# Patient Record
Sex: MALE | URBAN - NONMETROPOLITAN AREA
[De-identification: names, ages, dates, MRNs, and addresses within clinical notes are randomized per-mention and may not be internally consistent; named-entity substitution may affect disease eponyms.]

---

## 2020-10-12 ENCOUNTER — HOSPITAL ENCOUNTER (OUTPATIENT)
Dept: LAB | Age: 55
Discharge: HOME | End: 2020-10-12

## 2020-10-12 ENCOUNTER — OFFICE VISIT (OUTPATIENT)
Dept: FAMILY MEDICINE CLINIC | Age: 55
End: 2020-10-12
Payer: COMMERCIAL

## 2020-10-12 VITALS
SYSTOLIC BLOOD PRESSURE: 140 MMHG | OXYGEN SATURATION: 97 % | BODY MASS INDEX: 28 KG/M2 | TEMPERATURE: 96.7 F | WEIGHT: 200 LBS | DIASTOLIC BLOOD PRESSURE: 94 MMHG | HEART RATE: 98 BPM | HEIGHT: 71 IN

## 2020-10-12 DIAGNOSIS — Z00.00 ENCOUNTER FOR PREVENTATIVE ADULT HEALTH CARE EXAMINATION: Primary | ICD-10-CM

## 2020-10-12 DIAGNOSIS — Z00.00 ENCOUNTER FOR PREVENTATIVE ADULT HEALTH CARE EXAMINATION: ICD-10-CM

## 2020-10-12 PROBLEM — M25.561 ACUTE PAIN OF RIGHT KNEE: Status: ACTIVE | Noted: 2020-10-12

## 2020-10-12 PROCEDURE — 99396 PREV VISIT EST AGE 40-64: CPT | Performed by: FAMILY MEDICINE

## 2020-10-12 RX ORDER — PREDNISONE 20 MG/1
TABLET ORAL
Qty: 21 TAB | Refills: 0 | Status: SHIPPED | OUTPATIENT
Start: 2020-10-12

## 2020-10-12 RX ORDER — ATORVASTATIN CALCIUM 20 MG/1
20 TABLET, FILM COATED ORAL DAILY
Qty: 30 TAB | Refills: 2 | Status: SHIPPED | OUTPATIENT
Start: 2020-10-12 | End: 2021-01-10

## 2020-10-12 RX ORDER — ATORVASTATIN CALCIUM 20 MG/1
1 TABLET, FILM COATED ORAL
COMMUNITY
Start: 2020-09-08 | End: 2020-10-12

## 2020-10-12 NOTE — PROGRESS NOTES
Subjective:   Twin Kruse is a 60 y.o. male who was seen for Knee Pain    HPI patient is a 57-year-old male who is seen for evaluation and physical exam.  He has had wellness on his mind but been able to try to get into our office is also had some right knee discomfort. He has a history of some fullness at the left lateral prostate and has been evaluated by urology and they told us not to worry about that. No falls or injuries no rash no syncope no loss of consciousness. He has had the knee pain really for the last several months after driving back and forth to [place] no chest congestion or cough no rash no syncope or loss of consciousness. Bowel movements have been appropriate. His colonoscopy is 7 years and was appropriate. He eats well. He denies any anxiety or depression. No falls or injuries. Home Medications    Medication Sig Start Date End Date Taking? Authorizing Provider   atorvastatin (LIPITOR) 20 mg tablet Take 1 Tab by mouth nightly. 9/8/20   Provider, Historical      No Known Allergies  Social History     Tobacco Use    Smoking status: Former Smoker     Packs/day: 1.00     Years: 25.00     Pack years: 25.00     Last attempt to quit: 3/30/2010     Years since quitting: 10.5    Smokeless tobacco: Never Used   Substance Use Topics    Alcohol use: Yes     Alcohol/week: 10.0 standard drinks     Types: 10 Shots of liquor per week    Drug use: Never            Review of Systems   Constitutional: Negative. HENT: Negative. Respiratory: Negative. Cardiovascular: Negative. Gastrointestinal: Negative. Endocrine: Negative. Genitourinary: Negative. Musculoskeletal: Positive for back pain and joint swelling. Allergic/Immunologic: Negative. Neurological: Negative. Hematological: Negative. Psychiatric/Behavioral: Negative.          Physical Exam   Objective:     Visit Vitals  BP (!) 140/94 (BP 1 Location: Right arm, BP Patient Position: Sitting)   Pulse 98   Temp (!) 96.7 °F (35.9 °C)   Ht 5' 11\" (1.803 m)   Wt 200 lb (90.7 kg)   SpO2 97%   BMI 27.89 kg/m²      General: alert, cooperative, no distress   Mental  status: normal mood, behavior, speech, dress, motor activity, and thought processes, able to follow commands   HENT: NCAT   Neck: no visualized mass   Resp: no respiratory distress   Neuro: no gross deficits   Skin: no discoloration or lesions of concern on visible areas   Psychiatric: normal affect, consistent with stated mood, no evidence of hallucinations   Slightly decreased range of motion at the back. Genitalia are normal he is uncircumcised. The rectal exam shows some fullness at the bottom of the right side of the prostate which is unchanged from previous exams. He is oriented x3 not clinically anxious or depressed. He is pleasant and cooperative today. His blood pressure was 130/80    Assessment & Plan: At wellness exam, right knee pain: His medications have been refilled. His blood work will be ordered. We will follow up with a PSA as well. No other changes in medicine. We have refilled his medicine I am going to call in some prednisone for the knee discomfort. I will call you with results of his blood work. [id number]    Additional exam findings: We discussed the expected course, resolution and complications of the diagnosis(es) in detail. Medication risks, benefits, costs, interactions, and alternatives were discussed as indicated. I advised him to contact the office if his condition worsens, changes or fails to improve as anticipated. He expressed understanding with the diagnosis(es) and plan.

## 2020-10-12 NOTE — PROGRESS NOTES
Tameka Luis. presents today for   Chief Complaint   Patient presents with    Knee Pain       Is someone accompanying this pt? no    Is the patient using any DME equipment during OV? no    Depression Screening:  3 most recent PHQ Screens 10/12/2020   Little interest or pleasure in doing things Not at all   Feeling down, depressed, irritable, or hopeless Not at all   Total Score PHQ 2 0       Learning Assessment:  Learning Assessment 10/12/2020   PRIMARY LEARNER Patient   PRIMARY LANGUAGE ENGLISH   LEARNER PREFERENCE PRIMARY DEMONSTRATION   ANSWERED BY patient   RELATIONSHIP SELF       Health Maintenance reviewed and discussed and ordered per Provider. Health Maintenance Due   Topic Date Due    Hepatitis C Screening  1965    Lipid Screen  08/05/1973    DTaP/Tdap/Td series (1 - Tdap) 08/05/1984    Shingrix Vaccine Age 50> (1 of 2) 08/05/2013    FOBT Q1Y Age 50-75  08/05/2013    Flu Vaccine (1) 09/01/2020   . Coordination of Care:  1. Have you been to the ER, urgent care clinic since your last visit? Hospitalized since your last visit? no    2. Have you seen or consulted any other health care providers outside of the [address] [place] [address] since your last visit? Include any pap smears or colon screening.  no    Providers orders his own labs, orders for colonoscopy, mammograms and referrals as needed    Last UDS Checked no  Last Pain contract signed: no

## 2020-10-13 LAB — PSA, EXTERNAL: 1.3

## 2020-10-23 ENCOUNTER — TELEPHONE (OUTPATIENT)
Dept: FAMILY MEDICINE CLINIC | Age: 55
End: 2020-10-23

## 2020-10-23 DIAGNOSIS — G89.29 CHRONIC PAIN OF RIGHT KNEE: Primary | ICD-10-CM

## 2020-10-23 DIAGNOSIS — M25.561 CHRONIC PAIN OF RIGHT KNEE: Primary | ICD-10-CM

## 2020-11-11 PROBLEM — Z00.00 ENCOUNTER FOR PREVENTATIVE ADULT HEALTH CARE EXAMINATION: Status: RESOLVED | Noted: 2020-10-12 | Resolved: 2020-11-11

## 2021-01-16 RX ORDER — ATORVASTATIN CALCIUM 20 MG/1
TABLET, FILM COATED ORAL
Qty: 30 TAB | Refills: 1 | Status: SHIPPED | OUTPATIENT
Start: 2021-01-16 | End: 2021-04-19

## 2021-04-19 RX ORDER — ATORVASTATIN CALCIUM 20 MG/1
TABLET, FILM COATED ORAL
Qty: 30 TAB | Refills: 1 | Status: SHIPPED | OUTPATIENT
Start: 2021-04-19

## 2022-03-18 PROBLEM — M25.561 ACUTE PAIN OF RIGHT KNEE: Status: ACTIVE | Noted: 2020-10-12

## 2023-12-11 ENCOUNTER — OFFICE VISIT (OUTPATIENT)
Age: 58
End: 2023-12-11

## 2023-12-11 VITALS — BODY MASS INDEX: 28 KG/M2 | HEIGHT: 71 IN | WEIGHT: 200 LBS

## 2023-12-11 DIAGNOSIS — G89.29 CHRONIC PAIN OF RIGHT KNEE: Primary | ICD-10-CM

## 2023-12-11 DIAGNOSIS — M17.11 OSTEOARTHRITIS OF RIGHT KNEE, UNSPECIFIED OSTEOARTHRITIS TYPE: ICD-10-CM

## 2023-12-11 DIAGNOSIS — M25.561 CHRONIC PAIN OF RIGHT KNEE: Primary | ICD-10-CM

## 2023-12-11 RX ORDER — LIDOCAINE HYDROCHLORIDE 10 MG/ML
9 INJECTION, SOLUTION INFILTRATION; PERINEURAL ONCE
Status: COMPLETED | OUTPATIENT
Start: 2023-12-11 | End: 2023-12-11

## 2023-12-11 RX ORDER — TRIAMCINOLONE ACETONIDE 40 MG/ML
40 INJECTION, SUSPENSION INTRA-ARTICULAR; INTRAMUSCULAR ONCE
Status: COMPLETED | OUTPATIENT
Start: 2023-12-11 | End: 2023-12-11

## 2023-12-11 RX ADMIN — LIDOCAINE HYDROCHLORIDE 9 ML: 10 INJECTION, SOLUTION INFILTRATION; PERINEURAL at 15:38

## 2023-12-11 RX ADMIN — TRIAMCINOLONE ACETONIDE 40 MG: 40 INJECTION, SUSPENSION INTRA-ARTICULAR; INTRAMUSCULAR at 15:38

## 2023-12-11 SDOH — HEALTH STABILITY: PHYSICAL HEALTH: ON AVERAGE, HOW MANY MINUTES DO YOU ENGAGE IN EXERCISE AT THIS LEVEL?: 30 MIN

## 2023-12-11 SDOH — HEALTH STABILITY: PHYSICAL HEALTH: ON AVERAGE, HOW MANY DAYS PER WEEK DO YOU ENGAGE IN MODERATE TO STRENUOUS EXERCISE (LIKE A BRISK WALK)?: 5 DAYS

## 2024-04-11 ENCOUNTER — OFFICE VISIT (OUTPATIENT)
Age: 59
End: 2024-04-11
Payer: COMMERCIAL

## 2024-04-11 DIAGNOSIS — M25.561 CHRONIC PAIN OF RIGHT KNEE: ICD-10-CM

## 2024-04-11 DIAGNOSIS — M17.11 OSTEOARTHRITIS OF RIGHT KNEE, UNSPECIFIED OSTEOARTHRITIS TYPE: Primary | ICD-10-CM

## 2024-04-11 DIAGNOSIS — G89.29 CHRONIC PAIN OF RIGHT KNEE: ICD-10-CM

## 2024-04-11 PROCEDURE — 20611 DRAIN/INJ JOINT/BURSA W/US: CPT

## 2024-04-11 RX ORDER — MELOXICAM 15 MG/1
TABLET ORAL
COMMUNITY
Start: 2024-03-15

## 2024-04-11 RX ORDER — TRIAMCINOLONE ACETONIDE 40 MG/ML
40 INJECTION, SUSPENSION INTRA-ARTICULAR; INTRAMUSCULAR ONCE
Status: COMPLETED | OUTPATIENT
Start: 2024-04-11 | End: 2024-04-11

## 2024-04-11 RX ORDER — POLYETHYLENE GLYCOL-3350 AND ELECTROLYTES 236; 6.74; 5.86; 2.97; 22.74 G/274.31G; G/274.31G; G/274.31G; G/274.31G; G/274.31G
POWDER, FOR SOLUTION ORAL
COMMUNITY
Start: 2024-03-11

## 2024-04-11 RX ORDER — LIDOCAINE HYDROCHLORIDE 10 MG/ML
9 INJECTION, SOLUTION INFILTRATION; PERINEURAL ONCE
Status: COMPLETED | OUTPATIENT
Start: 2024-04-11 | End: 2024-04-11

## 2024-04-11 RX ADMIN — TRIAMCINOLONE ACETONIDE 40 MG: 40 INJECTION, SUSPENSION INTRA-ARTICULAR; INTRAMUSCULAR at 10:48

## 2024-04-11 RX ADMIN — LIDOCAINE HYDROCHLORIDE 9 ML: 10 INJECTION, SOLUTION INFILTRATION; PERINEURAL at 10:48

## 2024-04-11 NOTE — PATIENT INSTRUCTIONS
you start to have pain, rest your knee until your pain gets back to the level that is normal for you. Or cycle for less time or with less effort.  Follow-up care is a key part of your treatment and safety. Be sure to make and go to all appointments, and call your doctor if you are having problems. It's also a good idea to know your test results and keep a list of the medicines you take.  Current as of: July 18, 2023               Content Version: 13.9  © 2006-2023 [employer].   Care instructions adapted under license by [employer]. If you have questions about a medical condition or this instruction, always ask your healthcare professional. [employer] disclaims any warranty or liability for your use of this information.

## 2024-04-11 NOTE — PROGRESS NOTES
understanding and gave informed consent for the injection. The patient's knee was flexed to 90° and the skin prepped using sterile alcohol solution. A sterile needle was inserted into the right knee and the mixture of 9 mL Lidocaine 1%, 1 mL Kenalog 40 mg was injected under sterile technique. The needle was withdrawn and the puncture site sealed with a Band-Aid.      Technique: Under sterile conditions a [employer] ultrasound unit with a variable frequency (7.0-14.0 MHz) linear transducer was used to localize the placement of needle into the right knee joint.     Findings: Successful needle placement for knee injection.  Final images were taken and saved for permanent record.       The patient tolerated the injection well. The patient was instructed to call the office immediately if there is any pain, redness, warmth, fever, or chills.       Plan:  Plan will be for right knee cortisone injection today, if it helps that is all we need to do otherwise we will see the patient back as needed or if symptoms worsen.  Activity and weightbearing as tolerated, no restrictions.    Encounter Diagnoses   Name Primary?    Osteoarthritis of right knee, unspecified osteoarthritis type Yes    Chronic pain of right knee         As part of continued conservative pain management options the patient was advised to utilize Tylenol or OTC NSAIDS as long as it is not medically contraindicated.     Return to Office:   Follow-up and Dispositions    Return if symptoms worsen or fail to improve.        Scribed by CHANDA Alvarado - CNP as dictated by CHANDA Godfrey, CNP.  Documentation, performed by, True and Accepted CHANDA Godfrey, CNP

## 2024-07-18 ENCOUNTER — OFFICE VISIT (OUTPATIENT)
Age: 59
End: 2024-07-18
Payer: COMMERCIAL

## 2024-07-18 VITALS — WEIGHT: 200 LBS | BODY MASS INDEX: 28 KG/M2 | HEIGHT: 71 IN

## 2024-07-18 DIAGNOSIS — M17.11 OSTEOARTHRITIS OF RIGHT KNEE, UNSPECIFIED OSTEOARTHRITIS TYPE: ICD-10-CM

## 2024-07-18 DIAGNOSIS — Z01.818 PRE-OP TESTING: ICD-10-CM

## 2024-07-18 DIAGNOSIS — M17.11 OSTEOARTHRITIS OF RIGHT KNEE, UNSPECIFIED OSTEOARTHRITIS TYPE: Primary | ICD-10-CM

## 2024-07-18 PROCEDURE — 99214 OFFICE O/P EST MOD 30 MIN: CPT | Performed by: ORTHOPAEDIC SURGERY

## 2024-07-18 NOTE — PROGRESS NOTES
Name: Gonzalo Whitmore Jr.    : 1965     [facility]  [address], [facility]  [facility] [address]  Dept: [phone]  Dept Fax: [phone]     Chief Complaint   Patient presents with    Knee Pain        Ht 1.803 m (5' 11\")   Wt 90.7 kg (200 lb)   BMI 27.89 kg/m²      No Known Allergies     Current Outpatient Medications   Medication Sig Dispense Refill    meloxicam (MOBIC) 15 MG tablet TAKE 1/2 TABLET BY MOUTH TWICE A DAY WITH FOOD AS NEEDED 30      GAVILYTE-G 236 g solution FOLLOW PREP INSTRUCTIONS FROM YOUR DOCTOR'S OFFICE, NOT THE BRAND LABEL      atorvastatin (LIPITOR) 20 MG tablet TAKE 1 TABLET BY MOUTH EVERY DAY       No current facility-administered medications for this visit.       Patient Active Problem List   Diagnosis    Acute pain of right knee      Family History   Problem Relation Age of Onset    Cancer Father        Past Surgical History:   Procedure Laterality Date    DENTAL SURGERY      NC UNLISTED PROCEDURE ABDOMEN PERITONEUM & OMENTUM        Past Medical History:   Diagnosis Date    Hypercholesterolemia         I have reviewed and agree with PFSH and ROS and intake form in chart and the record furthermore I have reviewed prior medical record(s) regarding this patients care during this appointment.     Review of Systems:   Patient is a pleasant appearing individual, appropriately dressed, well hydrated, well nourished, who is alert, appropriately oriented for age, and in no acute distress with a normal gait and normal affect who does not appear to be in any significant pain.    Physical Exam:  Right Knee -Decrease range of motion with flexion, Knee arc of greater than 50 degrees, Some crepitation, Grossly neurovascularly intact, Good cap refill, No skin lesion, Moderate swelling, some gross instability, Some quadriceps weakness, Kellgren and Romel at least grade 4    Left Knee - Full Range of

## 2024-09-28 LAB
HBA1C MFR BLD HPLC: 5.5 %
PSA, EXTERNAL: 2

## 2024-10-02 DIAGNOSIS — Z96.651 STATUS POST TOTAL RIGHT KNEE REPLACEMENT: Primary | ICD-10-CM

## 2024-10-09 ENCOUNTER — TELEPHONE (OUTPATIENT)
Age: 59
End: 2024-10-09

## 2024-10-09 NOTE — TELEPHONE ENCOUNTER
10/22/24 RT DEJAHR @ [facility]    Wants to cx his surgery bc of his job right now. He will call back when he is ready to r/s.    Addended by: PAPO JOHNSON on: 6/15/2018 11:19 AM     Modules accepted: Orders